# Patient Record
Sex: MALE | Race: WHITE | NOT HISPANIC OR LATINO | Employment: FULL TIME | URBAN - METROPOLITAN AREA
[De-identification: names, ages, dates, MRNs, and addresses within clinical notes are randomized per-mention and may not be internally consistent; named-entity substitution may affect disease eponyms.]

---

## 2023-11-09 ENCOUNTER — HOSPITAL ENCOUNTER (EMERGENCY)
Facility: CLINIC | Age: 28
Discharge: HOME | End: 2023-11-09
Payer: OTHER MISCELLANEOUS

## 2023-11-09 VITALS
RESPIRATION RATE: 18 BRPM | TEMPERATURE: 98.2 F | OXYGEN SATURATION: 100 % | DIASTOLIC BLOOD PRESSURE: 72 MMHG | SYSTOLIC BLOOD PRESSURE: 130 MMHG | HEART RATE: 75 BPM

## 2023-11-09 DIAGNOSIS — S01.81XA CHIN LACERATION, INITIAL ENCOUNTER: ICD-10-CM

## 2023-11-09 DIAGNOSIS — Z23 ENCOUNTER FOR IMMUNIZATION: ICD-10-CM

## 2023-11-09 PROCEDURE — 90471 IMMUNIZATION ADMIN: CPT

## 2023-11-09 PROCEDURE — 12011 RPR F/E/E/N/L/M 2.5 CM/<: CPT

## 2023-11-09 PROCEDURE — 250N000009 HC RX 250

## 2023-11-09 PROCEDURE — 90715 TDAP VACCINE 7 YRS/> IM: CPT

## 2023-11-09 PROCEDURE — 99283 EMERGENCY DEPT VISIT LOW MDM: CPT | Mod: 25

## 2023-11-09 PROCEDURE — 250N000011 HC RX IP 250 OP 636

## 2023-11-09 RX ADMIN — CLOSTRIDIUM TETANI TOXOID ANTIGEN (FORMALDEHYDE INACTIVATED), CORYNEBACTERIUM DIPHTHERIAE TOXOID ANTIGEN (FORMALDEHYDE INACTIVATED), BORDETELLA PERTUSSIS TOXOID ANTIGEN (GLUTARALDEHYDE INACTIVATED), BORDETELLA PERTUSSIS FILAMENTOUS HEMAGGLUTININ ANTIGEN (FORMALDEHYDE INACTIVATED), BORDETELLA PERTUSSIS PERTACTIN ANTIGEN, AND BORDETELLA PERTUSSIS FIMBRIAE 2/3 ANTIGEN 0.5 ML: 5; 2; 2.5; 5; 3; 5 INJECTION, SUSPENSION INTRAMUSCULAR at 15:53

## 2023-11-09 RX ADMIN — Medication: at 15:57

## 2023-11-09 NOTE — ED PROVIDER NOTES
History     Chief Complaint:  Facial Laceration       The history is provided by the patient.      Garcia Mercado is a 28 year old male who presents with a laceration to his chin, on the left side below his lip. Earlier today, he accidentally scrapped his face on a tree branch. The laceration did not go through his lip, and the bleeding was controlled prior to arrival.  No foreign body.  Last tetanus 8/1/2012.  No other injuries incurred in this event.    Independent Historian:   None - Patient Only    Review of External Notes:   I reviewed his records which shows that his last tetanus was 8/1/2012.     Medications:    Atarax     Past Medical History:    Anxiety  Depression      Past Surgical History:    T & A   Lumbar tumor resection      Physical Exam   Patient Vitals for the past 24 hrs:   BP Temp Temp src Pulse Resp SpO2   11/09/23 1228 (!) 143/74 98.2  F (36.8  C) Oral 68 18 98 %        Physical Exam  General: Adult male sitting on recliner.  HENT:   Head: Patient has a 2.5 cm linear laceration to the left side of his chin.  This is just below the lip and does not cross the vermilion border.  There is no foreign body.  Mouth/Throat: Oropharynx clear and moist.  Eyes: Conjunctive and EOM normal. PERRLA.  Neck: Normal ROM. No rigidity.  CV: Regular rate and rhythm.   Resp: Normal respiratory effort.   MSK: Normal range of motion.  Skin: Warm and dry.See above.  Neuro: Awake, alert, oriented x 3.  Psych: Normal mood and affect.    Emergency Department Course   Procedures      Laceration Repair      LACERATION:  A simple clean 2.5 cm laceration.    LOCATION:  Chin    FUNCTION:  Distally sensation, circulation, and motor are intact.    ANESTHESIA:  LET.    PREPARATION:  Irrigation with [employer].    DEBRIDEMENT:  no debridement.    CLOSURE:  Wound was closed with One Layer.  Skin closed with 6 x 5.0 Ethylon using interrupted sutures..    Emergency Department Course & Assessments:    Interventions:  1553 Tdap  0.5 ml IM  Assessments:  1601 I obtained history and examined the patient as noted above. A laceration repair was performed as outlined in the procedure note above. The patient tolerated well and there were no complications.    Independent Interpretation (X-rays, CTs, rhythm strip):  None    Consultations/Discussion of Management or Tests:  None      Social Determinants of Health affecting care:   None    Disposition:  The patient was discharged to home.     Impression & Plan    Medical Decision Making:  Garcia Mercado is a 28 year old male presented with a facial laceration.  No red flags and a benign mechanism so there is no indication for CT brain imaging. Tetanus was updated here since his last dose was in 2012. The wound was carefully evaluated and explored.  The laceration was closed with 6 x 5.0 Ethylon stitches as noted herein.  There is no evidence of foreign body with this laceration.  Possible complications (infection, scarring) were reviewed.  I also discussed signs of infection including redness, warmth, foul-smelling drainage and worsening pain and instructed the patient to return promptly to the ER for re-evaluation should any of these develop.  Patient will go to his PCP in 5 days for suture removal.  In the meantime will apply bacitracin daily and keep the wound clean and dry.    Diagnosis:    ICD-10-CM    1. Chin laceration, initial encounter  S01.81XA       2. Encounter for immunization  Z23     Tetanus updated             Scribe Disclosure:  I, Ant Khanna, am serving as a scribe at 3:44 PM on 11/9/2023 to document services personally performed by Mariam Maria PA-C based on my observations and the provider's statements to me.   11/9/2023   Mariam Maria PA-C Dewing, Jennifer C, PA-C  11/09/23 2044

## 2023-11-09 NOTE — ED TRIAGE NOTES
Pt arrives with c/o laceration to below left side of lower lip. Pt reports laceration does not go through to inside of mouth. Bleeding controlled PTA.     Triage Assessment (Adult)       Row Name 11/09/23 1226          Triage Assessment    Airway WDL WDL        Respiratory WDL    Respiratory WDL WDL        Skin Circulation/Temperature WDL    Skin Circulation/Temperature WDL WDL        Cardiac WDL    Cardiac WDL WDL        Peripheral/Neurovascular WDL    Peripheral Neurovascular WDL WDL        Cognitive/Neuro/Behavioral WDL    Cognitive/Neuro/Behavioral WDL WDL

## 2023-11-15 ENCOUNTER — OFFICE VISIT (OUTPATIENT)
Dept: PEDIATRICS | Facility: CLINIC | Age: 28
End: 2023-11-15
Payer: OTHER MISCELLANEOUS

## 2023-11-15 VITALS
SYSTOLIC BLOOD PRESSURE: 120 MMHG | RESPIRATION RATE: 18 BRPM | BODY MASS INDEX: 29.81 KG/M2 | OXYGEN SATURATION: 98 % | DIASTOLIC BLOOD PRESSURE: 76 MMHG | HEART RATE: 79 BPM | TEMPERATURE: 97.5 F | WEIGHT: 220.1 LBS | HEIGHT: 72 IN

## 2023-11-15 DIAGNOSIS — S01.81XD FACIAL LACERATION, SUBSEQUENT ENCOUNTER: Primary | ICD-10-CM

## 2023-11-15 PROCEDURE — 99203 OFFICE O/P NEW LOW 30 MIN: CPT | Mod: GC | Performed by: STUDENT IN AN ORGANIZED HEALTH CARE EDUCATION/TRAINING PROGRAM

## 2023-11-15 PROCEDURE — S0630 REMOVAL OF SUTURES: HCPCS | Performed by: STUDENT IN AN ORGANIZED HEALTH CARE EDUCATION/TRAINING PROGRAM

## 2023-11-15 ASSESSMENT — PAIN SCALES - GENERAL: PAINLEVEL: NO PAIN (0)

## 2023-11-15 NOTE — PATIENT INSTRUCTIONS
It was miguel to see you in clinic today. You were seen for removal of sutures after ER visit. ER visit 11/9/23, sutures removed 11/15/23.     The incision looks healthy and well healed. Continue to keep the area clean with soap and water.     Please do not hesitate to call with questions.     -Dr. Jacqui Conway MD

## 2023-11-15 NOTE — PROGRESS NOTES
"  Assessment & Plan     Facial laceration, subsequent encounter  Garcia is a 28-year-old male who presents to clinic today for removal of sutures after ED visit for chin laceration on 11/9.  Incision is well approximated with slight scab, no erythema, palpable pain, or drainage.  Patient has been placing bacitracin topically.  Sutures removed in clinic.  Advised to keep area clean with soap and water  - REMOVAL OF SUTURES      BMI:   Estimated body mass index is 30.19 kg/m  as calculated from the following:    Height as of this encounter: 1.819 m (5' 11.6\").    Weight as of this encounter: 99.8 kg (220 lb 1.6 oz).   Weight management plan: Patient was referred to their PCP to discuss a diet and exercise plan.    See Patient Instructions    Jacqui Conway MD  [facility]   Garcia is a 28 year old, presenting for the following health issues:  Suture Removal        11/15/2023     8:40 AM   Additional Questions   Roomed by Jessy Bailon CMA       Suture Removal    History of Present Illness       Reason for visit:  Stitches removed  Symptom onset:  3-7 days ago  Symptoms include:  Laceration  Symptom intensity:  Moderate  Symptom progression:  Improving  Had these symptoms before:  No  What makes it worse:  No  What makes it better:  No    He eats 0-1 servings of fruits and vegetables daily.He consumes 2 sweetened beverage(s) daily.He exercises with enough effort to increase his heart rate 60 or more minutes per day.  He exercises with enough effort to increase his heart rate 4 days per week.   He is taking medications regularly.     As per ED note 11/9:   laceration to his chin, on the left side below his lip. Earlier today, he accidentally scrapped his face on a tree branch. The laceration did not go through his lip, and the bleeding was controlled prior to arrival.  No foreign body.  Last tetanus 8/1/2012.No other injuries incurred in this event.     Today patient feels incision " "is well-healed.  Has not had any pain or drainage from the site.  Notes that one of the sutures fell out 2 days prior to this visit.  No separation of the skin.  He is keeping the area clean with soap and water and placing bacitracin on it topically. Tetanus updated at ED visit.       Review of Systems   Constitutional, HEENT, cardiovascular, pulmonary, gi and gu systems are negative, except as otherwise noted.      Objective    /76 (BP Location: Right arm, Cuff Size: Adult Large)   Pulse 79   Temp 97.5  F (36.4  C) (Tympanic)   Resp 18   Ht 1.819 m (5' 11.6\")   Wt 99.8 kg (220 lb 1.6 oz)   SpO2 98%   BMI 30.19 kg/m    Body mass index is 30.19 kg/m .  Physical Exam   GENERAL: healthy, alert and no distress  EYES: Eyes grossly normal to inspection, PERRL and conjunctivae and sclerae normal  HENT: normal cephalic/atraumatic.2.5 linear laceration extending from left lower corner of the lip across chin, well approximated and healed with small amount of scab, no erythema or drainage.   RESP: Breathing comfortably on RA   CV: regular rates and rhythm and peripheral pulses strong  MS: no gross musculoskeletal defects noted, no edema  NEURO: Normal strength and tone, mentation intact and speech normal  PSYCH: mentation appears normal, affect normal/bright    No labs obtained this visit     Physician Attestation   I, Mehdi Hannon MD, discussed the patient with the resident during the patient s visit on 11/15/23, and agree with the resident s assessment and plan of care.  I was immediately available to the patient should the need have arisen.                  "